# Patient Record
Sex: FEMALE | Race: ASIAN | Employment: FULL TIME | URBAN - METROPOLITAN AREA
[De-identification: names, ages, dates, MRNs, and addresses within clinical notes are randomized per-mention and may not be internally consistent; named-entity substitution may affect disease eponyms.]

---

## 2021-03-24 ENCOUNTER — OFFICE VISIT (OUTPATIENT)
Dept: FAMILY MEDICINE | Facility: CLINIC | Age: 46
End: 2021-03-24
Payer: COMMERCIAL

## 2021-03-24 VITALS
BODY MASS INDEX: 23.14 KG/M2 | HEIGHT: 66 IN | SYSTOLIC BLOOD PRESSURE: 118 MMHG | TEMPERATURE: 97.6 F | HEART RATE: 95 BPM | DIASTOLIC BLOOD PRESSURE: 78 MMHG | WEIGHT: 144 LBS | OXYGEN SATURATION: 97 %

## 2021-03-24 DIAGNOSIS — R82.90 NONSPECIFIC FINDING ON EXAMINATION OF URINE: ICD-10-CM

## 2021-03-24 DIAGNOSIS — R10.9 FLANK PAIN: Primary | ICD-10-CM

## 2021-03-24 DIAGNOSIS — B18.1 HEPATITIS B CARRIER (H): ICD-10-CM

## 2021-03-24 DIAGNOSIS — Z23 NEED FOR PROPHYLACTIC VACCINATION AND INOCULATION AGAINST INFLUENZA: ICD-10-CM

## 2021-03-24 LAB
ALBUMIN SERPL-MCNC: 3.6 G/DL (ref 3.4–5)
ALBUMIN UR-MCNC: NEGATIVE MG/DL
ALP SERPL-CCNC: 60 U/L (ref 40–150)
ALT SERPL W P-5'-P-CCNC: 25 U/L (ref 0–50)
ANION GAP SERPL CALCULATED.3IONS-SCNC: 6 MMOL/L (ref 3–14)
APPEARANCE UR: ABNORMAL
AST SERPL W P-5'-P-CCNC: 15 U/L (ref 0–45)
BACTERIA #/AREA URNS HPF: ABNORMAL /HPF
BILIRUB SERPL-MCNC: 0.4 MG/DL (ref 0.2–1.3)
BILIRUB UR QL STRIP: NEGATIVE
BUN SERPL-MCNC: 13 MG/DL (ref 7–30)
CALCIUM SERPL-MCNC: 9 MG/DL (ref 8.5–10.1)
CHLORIDE SERPL-SCNC: 108 MMOL/L (ref 94–109)
CO2 SERPL-SCNC: 23 MMOL/L (ref 20–32)
COLOR UR AUTO: YELLOW
CREAT SERPL-MCNC: 0.75 MG/DL (ref 0.52–1.04)
ERYTHROCYTE [DISTWIDTH] IN BLOOD BY AUTOMATED COUNT: 14.5 % (ref 10–15)
GFR SERPL CREATININE-BSD FRML MDRD: >90 ML/MIN/{1.73_M2}
GLUCOSE SERPL-MCNC: 109 MG/DL (ref 70–99)
GLUCOSE UR STRIP-MCNC: NEGATIVE MG/DL
HCT VFR BLD AUTO: 38.4 % (ref 35–47)
HGB BLD-MCNC: 12.9 G/DL (ref 11.7–15.7)
HGB UR QL STRIP: NEGATIVE
KETONES UR STRIP-MCNC: NEGATIVE MG/DL
LEUKOCYTE ESTERASE UR QL STRIP: ABNORMAL
MCH RBC QN AUTO: 25.6 PG (ref 26.5–33)
MCHC RBC AUTO-ENTMCNC: 33.6 G/DL (ref 31.5–36.5)
MCV RBC AUTO: 76 FL (ref 78–100)
NITRATE UR QL: NEGATIVE
NON-SQ EPI CELLS #/AREA URNS LPF: ABNORMAL /LPF
PH UR STRIP: 5.5 PH (ref 5–7)
PLATELET # BLD AUTO: 225 10E9/L (ref 150–450)
POTASSIUM SERPL-SCNC: 3.7 MMOL/L (ref 3.4–5.3)
PROT SERPL-MCNC: 7.5 G/DL (ref 6.8–8.8)
RBC # BLD AUTO: 5.03 10E12/L (ref 3.8–5.2)
RBC #/AREA URNS AUTO: ABNORMAL /HPF
SODIUM SERPL-SCNC: 137 MMOL/L (ref 133–144)
SOURCE: ABNORMAL
SP GR UR STRIP: 1.02 (ref 1–1.03)
UROBILINOGEN UR STRIP-ACNC: 0.2 EU/DL (ref 0.2–1)
WBC # BLD AUTO: 5 10E9/L (ref 4–11)
WBC #/AREA URNS AUTO: ABNORMAL /HPF

## 2021-03-24 PROCEDURE — 36415 COLL VENOUS BLD VENIPUNCTURE: CPT | Performed by: INTERNAL MEDICINE

## 2021-03-24 PROCEDURE — 99203 OFFICE O/P NEW LOW 30 MIN: CPT | Mod: 25 | Performed by: INTERNAL MEDICINE

## 2021-03-24 PROCEDURE — 90686 IIV4 VACC NO PRSV 0.5 ML IM: CPT | Performed by: INTERNAL MEDICINE

## 2021-03-24 PROCEDURE — 81001 URINALYSIS AUTO W/SCOPE: CPT | Performed by: INTERNAL MEDICINE

## 2021-03-24 PROCEDURE — 80053 COMPREHEN METABOLIC PANEL: CPT | Performed by: INTERNAL MEDICINE

## 2021-03-24 PROCEDURE — 87086 URINE CULTURE/COLONY COUNT: CPT | Performed by: INTERNAL MEDICINE

## 2021-03-24 PROCEDURE — 85027 COMPLETE CBC AUTOMATED: CPT | Performed by: INTERNAL MEDICINE

## 2021-03-24 PROCEDURE — 90471 IMMUNIZATION ADMIN: CPT | Performed by: INTERNAL MEDICINE

## 2021-03-24 ASSESSMENT — MIFFLIN-ST. JEOR: SCORE: 1324.93

## 2021-03-24 NOTE — PROGRESS NOTES
Assessment & Plan     Flank pain  Unclear etiology. Likely msk, but doesn't get worse with any movement.  Severity is mild and improves with NSAIDs.  Can certainly get lab work.  Recommended heating pad and NSAIDs prn   - *UA reflex to Microscopic and Culture ([facility] and [facility] (except [place] and [place])  - Comprehensive metabolic panel (BMP + Alb, Alk Phos, ALT, AST, Total. Bili, TP)  - CBC with platelets    Hepatitis B carrier (H)  Follows with GI in [place]     Need for prophylactic vaccination and inoculation against influenza  - INFLUENZA VACCINE IM > 6 MONTHS VALENT IIV4 [99699]      Return in about 2 weeks (around 4/7/2021) for If no improvement.    Fern Lo MD  [facility] KODI Cevallos is a 45 year old who presents for the following health issues     HPI   Ban is here with flank pain.  She lives in [place], but is here for several weeks.  Her father-in-law is in the area.  She has two daughters.  Works remotely right now, at a computer all day.     She is here with over 2 weeks of right mid back pain.  This started around the time of her period, but hasn't resolved.  Initially hurt to press on the area - lower ribs/right side, but not anymore.  Just sort of an ache that is always there.  Nothing really makes it worse.  Does improve with ibuprofen.  She has had some nausea as well, that also seems to be improving.  No urinary symptoms. No vomiting or change in bowel habits.  She mentions headache and ear ache the past couple days, without fever or other URI symptoms.  She is a hep B carrier (does follow with a specialist in [place]) and is a little worried there may be an issue with her organs that is the cause of this pain.       Back Pain  Onset/Duration: 2.5 weeks ago  Description:   Location of pain: low back right  Character of pain: sharp and waxing and waning  Pain radiation: upper back, chest - not currently, headaches on and off - mostly prior to  "menstrual. Feels that the pain has stuck around after the end of her menstrual cycle  New numbness or weakness in legs, not attributed to pain: no   Intensity: mild  Progression of Symptoms: waxing and waning  History:   Specific cause: none  Pain interferes with job:  no   History of back problems: no prior back problems  Any previous MRI or X-rays: None  Sees a specialist for back pain: No  Alleviating factors:   Improved by: Advil    Precipitating factors:  Worsened by: Nothing  Therapies tried and outcome: Advil        Review of Systems   Const, HEENT, cv, pulm, gi, gu, msk reviewed,  otherwise negative unless noted above.        Objective    /78   Pulse 95   Temp 97.6  F (36.4  C) (Tympanic)   Ht 1.676 m (5' 6\")   Wt 65.3 kg (144 lb)   LMP 03/03/2021   SpO2 97%   BMI 23.24 kg/m    Body mass index is 23.24 kg/m .  Physical Exam   Gen: well appearing, pleasant woman, no distress  Pulm: breathing comfortably, CTAB, no wheezes or rales  CV: RRR, normal S1 and S2, no murmurs  Abd: BS present, soft, nontender, nondistended, no CVA tenderness   Ext: 2+ distal pulses, no LE edema    MSK: no spinal or paraspinal tenderness                 "

## 2021-03-25 LAB
BACTERIA SPEC CULT: NORMAL
BACTERIA SPEC CULT: NORMAL
Lab: NORMAL
SPECIMEN SOURCE: NORMAL

## 2021-03-26 ENCOUNTER — TELEPHONE (OUTPATIENT)
Dept: FAMILY MEDICINE | Facility: CLINIC | Age: 46
End: 2021-03-26

## 2021-03-26 NOTE — TELEPHONE ENCOUNTER
The urine culture was negative.      Her presentation is not consistent with pancreatitis.  Pancreatitis typically causes pretty severe epigastric pain, nausea, vomiting, and is more acute.       I would recommend continuing with NSAIDs and symptomatic care.  Please let me know if it doesn't improve over the next couple weeks.     Fern Lo MD

## 2021-03-26 NOTE — TELEPHONE ENCOUNTER
S/w pt and gave Dr. Lo's reply about lab results.  Pt states she is still having the back pain and wondering if she should get her pancrease checked out and how to go about that?    Advised will call back with  results once available.    Pt can be reached at [phone].    Digna AVELAR RN  EP Triage

## 2021-03-26 NOTE — TELEPHONE ENCOUNTER
----- Message from Fern Lo MD sent at 3/25/2021  4:45 PM CDT -----  Please call and let Ban know her blood counts and CMP were normal.  The urine didn't have any blood concerning for a kidney stone.  Unlikely to be a UTI, but they sent a culture just in case.  Thank you.

## 2021-05-01 ENCOUNTER — HEALTH MAINTENANCE LETTER (OUTPATIENT)
Age: 46
End: 2021-05-01

## 2021-10-11 ENCOUNTER — HEALTH MAINTENANCE LETTER (OUTPATIENT)
Age: 46
End: 2021-10-11

## 2022-05-22 ENCOUNTER — HEALTH MAINTENANCE LETTER (OUTPATIENT)
Age: 47
End: 2022-05-22

## 2022-09-25 ENCOUNTER — HEALTH MAINTENANCE LETTER (OUTPATIENT)
Age: 47
End: 2022-09-25

## 2023-06-04 ENCOUNTER — HEALTH MAINTENANCE LETTER (OUTPATIENT)
Age: 48
End: 2023-06-04

## 2023-10-14 ENCOUNTER — HEALTH MAINTENANCE LETTER (OUTPATIENT)
Age: 48
End: 2023-10-14